# Patient Record
Sex: FEMALE | Race: BLACK OR AFRICAN AMERICAN | NOT HISPANIC OR LATINO | URBAN - METROPOLITAN AREA
[De-identification: names, ages, dates, MRNs, and addresses within clinical notes are randomized per-mention and may not be internally consistent; named-entity substitution may affect disease eponyms.]

---

## 2017-04-10 NOTE — PATIENT DISCUSSION
DRY EYES : Discussed with patient the importance of keeping the eye moist and the symptoms associated with dry eyes including blurry vision, tearing, burning, and christofer sensation. Advised patient to minimize use of any fans blowing directly on the face. Advised patient to continue with artificial tears 2-3 times daily.

## 2017-04-10 NOTE — PATIENT DISCUSSION
DERMATOCHALASIS OU: I have discussed with the patient that with age, the skin of the eyelid can sag and droop. In addition, the fat that surrounds and cushions the eyeball can bulge forward through the skin of the upper and lower lids. This can result in excess skin weighing down the upper lids, pushing down eyelashes and in some cases blocking peripheral vision, eye strain, and skin irritation. The risks of the procedure include but are not limited to pain, bleeding, infection, further surgery, scarring, injury to eye, dry eye syndrome, and lid asymmetry. Significant swelling and bruising after surgery should be expected. The patient was instructed to use ice and heat following the procedure, as well as artificial tears to reduce these symptoms. The patient understands and elects to follow at this time and call the office if symptoms worsen.

## 2018-06-11 NOTE — PATIENT DISCUSSION
Continue: Ocuflox (ofloxacin): drops: 0.3% 1 drop four times a day as directed into affected eye 06-

## 2018-06-21 NOTE — PATIENT DISCUSSION
POST-OP INSTRUCTIONS:  The patient is instructed in the proper use of post-operative eyedrops: Ocuflox in the affected eye 4 times per day as directed and prednisolone acetate 1% four times per day as directed, Acular four times per day as directed. The patient is also instructed to follow the usual post-operative precautions outlined in the written instructions including the need to keep a shield taped over the operated eye while sleeping for 2 nights, and the need to avoid heavy lifting for 1 week. The patient is instructed to contact me immediately for any significant pain around the eye, or sustained loss of vision. The patient will return for follow up as scheduled.

## 2018-08-16 NOTE — PATIENT DISCUSSION
Continue: ketorolac (ketorolac): drops: 0.5% 1 drop four times a day as directed into affected eye 08-

## 2018-08-16 NOTE — PATIENT DISCUSSION
Continue: ofloxacin (ofloxacin): drops: 0.3% 1 drop four times a day as directed into affected eye 08-

## 2018-08-16 NOTE — PATIENT DISCUSSION
Continue: prednisolone acetate (prednisolone acetate): drops,suspension: 1% 1 drop four times a day as directed into affected eye 08-

## 2018-09-06 NOTE — PATIENT DISCUSSION
POST-OP INSTRUCTIONS:  The patient is told to resume normal daily activities. New spectacle prescripton given to patient. Patient told to discontinue Acular and Oculfloxacin. PT D/C PF SOONER THAN INSTRUCTED AT THREE TIMES DAILY BUT NO CELL/INFLAMMATION IN A/C. WILL NOT HAVE PATIENT RESTART PF FOR TAPER. INSTRUCTED PT TO RTC IF RIGHT EYE STARTS TO FEEL IRRITATED OR SENSITIVE TO LIGHT. Will see patient back in 3 months for a DFE. Monitor.

## 2018-09-06 NOTE — PATIENT DISCUSSION
DRY EYE OU:  I have explained that dry eye syndrome may cause many ocular symptoms including irritation, burning, tearing, and blurry vision. Frequent high quality artificial tears will help relieve these symptoms. Recommend patient use over the counter artificial tears at least four times daily. The patient was given a list of quality artificial tears to choose from (Genteal, Systane, Refresh, TheraTears, Blink) and was advised not to use Visine or Clear Eyes. Warm compresses with digital massage three times daily. Advised patient that if symptoms of discomfort did not improve or if they worse, then the patient should return to clinic. Will continue to monitor.

## 2021-09-08 ENCOUNTER — APPOINTMENT (RX ONLY)
Dept: URBAN - METROPOLITAN AREA CLINIC 132 | Facility: CLINIC | Age: 66
Setting detail: DERMATOLOGY
End: 2021-09-08

## 2021-09-08 DIAGNOSIS — L57.8 OTHER SKIN CHANGES DUE TO CHRONIC EXPOSURE TO NONIONIZING RADIATION: ICD-10-CM

## 2021-09-08 DIAGNOSIS — D18.0 HEMANGIOMA: ICD-10-CM

## 2021-09-08 DIAGNOSIS — L72.8 OTHER FOLLICULAR CYSTS OF THE SKIN AND SUBCUTANEOUS TISSUE: ICD-10-CM

## 2021-09-08 PROBLEM — D18.01 HEMANGIOMA OF SKIN AND SUBCUTANEOUS TISSUE: Status: ACTIVE | Noted: 2021-09-08

## 2021-09-08 PROCEDURE — ? ADDITIONAL NOTES

## 2021-09-08 PROCEDURE — 99203 OFFICE O/P NEW LOW 30 MIN: CPT

## 2021-09-08 PROCEDURE — ? SUNSCREEN RECOMMENDATIONS

## 2021-09-08 PROCEDURE — ? COUNSELING

## 2021-09-08 PROCEDURE — ? DEFER

## 2021-09-08 ASSESSMENT — LOCATION DETAILED DESCRIPTION DERM
LOCATION DETAILED: EPIGASTRIC SKIN
LOCATION DETAILED: LEFT VENTRAL DISTAL FOREARM
LOCATION DETAILED: UPPER STERNUM
LOCATION DETAILED: LEFT CENTRAL MALAR CHEEK
LOCATION DETAILED: RIGHT VENTRAL PROXIMAL FOREARM
LOCATION DETAILED: LEFT INFERIOR LATERAL UPPER BACK
LOCATION DETAILED: RIGHT MEDIAL UPPER BACK

## 2021-09-08 ASSESSMENT — LOCATION SIMPLE DESCRIPTION DERM
LOCATION SIMPLE: ABDOMEN
LOCATION SIMPLE: RIGHT UPPER BACK
LOCATION SIMPLE: LEFT UPPER BACK
LOCATION SIMPLE: LEFT CHEEK
LOCATION SIMPLE: RIGHT FOREARM
LOCATION SIMPLE: LEFT FOREARM
LOCATION SIMPLE: CHEST

## 2021-09-08 ASSESSMENT — LOCATION ZONE DERM
LOCATION ZONE: ARM
LOCATION ZONE: FACE
LOCATION ZONE: TRUNK

## 2021-09-08 NOTE — PROCEDURE: MIPS QUALITY
Quality 110: Preventive Care And Screening: Influenza Immunization: Influenza Immunization Administered during Influenza season
Detail Level: Detailed
Quality 130: Documentation Of Current Medications In The Medical Record: Current Medications Documented
Quality 402: Tobacco Use And Help With Quitting Among Adolescents: Patient screened for tobacco and is an ex-smoker
Quality 431: Preventive Care And Screening: Unhealthy Alcohol Use - Screening: Patient identified as an unhealthy alcohol user when screened for unhealthy alcohol use using a systematic screening method and received brief counseling

## 2022-04-15 ENCOUNTER — NEW PATIENT (OUTPATIENT)
Dept: URBAN - METROPOLITAN AREA CLINIC 35 | Facility: CLINIC | Age: 67
End: 2022-04-15

## 2022-04-15 DIAGNOSIS — H04.123: ICD-10-CM

## 2022-04-15 DIAGNOSIS — M35.00: ICD-10-CM

## 2022-04-15 DIAGNOSIS — H25.813: ICD-10-CM

## 2022-04-15 DIAGNOSIS — H43.813: ICD-10-CM

## 2022-04-15 PROCEDURE — 92015 DETERMINE REFRACTIVE STATE: CPT

## 2022-04-15 PROCEDURE — 92004 COMPRE OPH EXAM NEW PT 1/>: CPT

## 2022-04-15 PROCEDURE — 92134 CPTRZ OPH DX IMG PST SGM RTA: CPT

## 2022-04-15 PROCEDURE — 92025 CPTRIZED CORNEAL TOPOGRAPHY: CPT

## 2022-04-15 RX ORDER — CYCLOSPORINE 0 MG/ML: 1 SOLUTION/ DROPS OPHTHALMIC; TOPICAL TWICE A DAY

## 2022-04-15 ASSESSMENT — VISUAL ACUITY
OS_SC: 20/60+1
OS_CC: J1
OD_SC: J12
OS_SC: J12
OD_CC: 20/25+2
OD_CC: J1
OS_CC: 20/20-1
OD_SC: 20/100

## 2022-04-15 ASSESSMENT — TONOMETRY
OD_IOP_MMHG: 16
OS_IOP_MMHG: 16

## 2022-04-15 NOTE — PATIENT DISCUSSION
HISTORY: Dx with [facility] 20 years ago. Dr. Mariel Devine patient. Possible Lupus as well, also has Raynauds. Currently on Plaquenil 200 mg BID OU.

## 2022-04-15 NOTE — PATIENT DISCUSSION
4/15/22 ASSESSMENT: Will send in cequa to [facility] to use BID OU. Recommended otc nighttime gel and PF tears. Discussed serum tears and informed her it is not covered by insurance. She is interested in trying the Serum tears. Will fax paperwork to [facility]. Recommended Dr. Aubrey Duran in [place] to check the retina (she has been on Plaquenil for >20 years). Also recommended Dr. Harvinder Gardiner for further dry eye treatment.

## 2024-08-20 NOTE — PATIENT DISCUSSION
DRY EYES : Discussed with patient the importance of keeping the eye moist and the symptoms associated with dry eyes including blurry vision, tearing, burning, and christofer sensation. Advised patient to minimize use of any fans blowing directly on the face. Advised patient to continue with artificial tears 2-3 times daily. N/A